# Patient Record
Sex: MALE | Race: WHITE | URBAN - METROPOLITAN AREA
[De-identification: names, ages, dates, MRNs, and addresses within clinical notes are randomized per-mention and may not be internally consistent; named-entity substitution may affect disease eponyms.]

---

## 2020-08-13 ENCOUNTER — OFFICE VISIT (OUTPATIENT)
Dept: URBAN - METROPOLITAN AREA CLINIC 32 | Facility: CLINIC | Age: 70
End: 2020-08-13
Payer: MEDICARE

## 2020-08-13 PROCEDURE — 99203 OFFICE O/P NEW LOW 30 MIN: CPT | Performed by: OPHTHALMOLOGY

## 2020-08-13 PROCEDURE — 92285 EXTERNAL OCULAR PHOTOGRAPHY: CPT | Performed by: OPHTHALMOLOGY

## 2020-08-13 ASSESSMENT — INTRAOCULAR PRESSURE
OD: 18
OS: 18

## 2020-08-13 NOTE — IMPRESSION/PLAN
Impression: Basal cell carcinoma of skin of left upper eyelid, including canthus: C44.1191. OS. Condition: new problem addtl w/u needed. Symptoms: may improve with surgery. Vision: vision threatening. Plan: Discussed diagnosis in detail with patient. Discussed treatment options with patient. Surgical treatment is required. Surgical risks and benefits were discussed, explained and understood by patient. Patient elects to have surgery. Recommend Mohs of left upper eyelid by EVD followed by reconstruction of left upper eyelid by myself. RL2, avoid NSAIDS 7 days prior to surgery.

## 2020-09-18 ENCOUNTER — SURGERY (OUTPATIENT)
Dept: URBAN - METROPOLITAN AREA SURGERY 11 | Facility: SURGERY | Age: 70
End: 2020-09-18
Payer: MEDICARE

## 2020-09-18 PROCEDURE — 14060 TIS TRNFR E/N/E/L 10 SQ CM/<: CPT | Performed by: OPHTHALMOLOGY

## 2020-09-24 ENCOUNTER — POST-OPERATIVE VISIT (OUTPATIENT)
Dept: URBAN - METROPOLITAN AREA CLINIC 32 | Facility: CLINIC | Age: 70
End: 2020-09-24

## 2020-09-24 PROCEDURE — 99024 POSTOP FOLLOW-UP VISIT: CPT | Performed by: OPHTHALMOLOGY

## 2020-09-24 ASSESSMENT — INTRAOCULAR PRESSURE: OD: 15

## 2020-09-24 NOTE — IMPRESSION/PLAN
Impression: S/P reconstruction of eyelid with skin graft  - 6 Days. Encounter for other specified surgical aftercare  Z48.89.  Post operative instructions reviewed - Condition is improving - Plan: --Continue BID to COLLEEN

## 2020-10-22 ENCOUNTER — POST-OPERATIVE VISIT (OUTPATIENT)
Dept: URBAN - METROPOLITAN AREA CLINIC 32 | Facility: CLINIC | Age: 70
End: 2020-10-22

## 2020-10-22 DIAGNOSIS — Z48.89 ENCOUNTER FOR OTHER SPECIFIED SURGICAL AFTERCARE: Primary | ICD-10-CM

## 2020-10-22 PROCEDURE — 99024 POSTOP FOLLOW-UP VISIT: CPT | Performed by: OPHTHALMOLOGY

## 2020-10-22 NOTE — IMPRESSION/PLAN
Impression: S/P Reconstruction  of COLLEEN w/SG OS - 34 Days. Encounter for other specified surgical aftercare  Z48.89.  Plan: 03/2021 for skin cancer surveillance --[facility]

## 2021-03-11 ENCOUNTER — OFFICE VISIT (OUTPATIENT)
Dept: URBAN - METROPOLITAN AREA CLINIC 32 | Facility: CLINIC | Age: 71
End: 2021-03-11
Payer: MEDICARE

## 2021-03-11 DIAGNOSIS — C44.1191 BASAL CELL CARCINOMA OF SKIN OF LEFT UPPER EYELID, INCLUDING CANTHUS: Primary | Chronic | ICD-10-CM

## 2021-03-11 DIAGNOSIS — Z85.828 PERSONAL HISTORY OF OTHER CANCER OF SKIN: Chronic | ICD-10-CM

## 2021-03-11 PROCEDURE — 99213 OFFICE O/P EST LOW 20 MIN: CPT | Performed by: OPHTHALMOLOGY

## 2021-03-11 ASSESSMENT — INTRAOCULAR PRESSURE
OS: 16
OD: 16

## 2021-03-11 NOTE — IMPRESSION/PLAN
Impression: Personal history of other cancer of skin: Z85.828. Left. Condition: established, stable.  Plan: see plan #1

## 2021-03-11 NOTE — IMPRESSION/PLAN
Impression: Basal cell carcinoma of skin of left upper eyelid, including canthus: C44.8011. Left. Condition: resolved. Symptoms: will continue to monitor. Vision: vision threatening. Plan: Discussed diagnosis in detail with patient. Discussed treatment options with patient. No treatment is required at this time, there is no evidence of recurrence. Any new eyelid lesions should be evaluated and treated as necessary. Continue regular full body dermatology visits as scheduled. Will continue to observe condition and or symptoms.

## 2022-08-11 ENCOUNTER — OFFICE VISIT (OUTPATIENT)
Dept: URBAN - METROPOLITAN AREA CLINIC 32 | Facility: CLINIC | Age: 72
End: 2022-08-11
Payer: MEDICARE

## 2022-08-11 DIAGNOSIS — Z85.828 PERSONAL HISTORY OF OTHER CANCER OF SKIN: Primary | ICD-10-CM

## 2022-08-11 PROCEDURE — 99213 OFFICE O/P EST LOW 20 MIN: CPT | Performed by: OPHTHALMOLOGY

## 2022-08-11 ASSESSMENT — INTRAOCULAR PRESSURE
OS: 20
OD: 19

## 2022-08-11 NOTE — IMPRESSION/PLAN
Impression: Personal history of other cancer of skin: Z85.828 Left. Condition: established, stable. Symptoms: will continue to monitor. Vision: vision not affected. Plan: Discussed diagnosis in detail with patient. Discussed treatment options with patient. No treatment is required at this time, no evidence of recurrence. Keep close monitoring by dermatology and return as needed.